# Patient Record
Sex: FEMALE | Race: AMERICAN INDIAN OR ALASKA NATIVE | Employment: OTHER | URBAN - METROPOLITAN AREA
[De-identification: names, ages, dates, MRNs, and addresses within clinical notes are randomized per-mention and may not be internally consistent; named-entity substitution may affect disease eponyms.]

---

## 2020-11-07 ENCOUNTER — HOSPITAL ENCOUNTER (EMERGENCY)
Age: 70
Discharge: HOME | End: 2020-11-07
Attending: EMERGENCY MEDICINE
Payer: MEDICARE

## 2020-11-07 VITALS
HEART RATE: 66 BPM | OXYGEN SATURATION: 99 % | DIASTOLIC BLOOD PRESSURE: 68 MMHG | RESPIRATION RATE: 12 BRPM | SYSTOLIC BLOOD PRESSURE: 131 MMHG | TEMPERATURE: 97.7 F

## 2020-11-07 LAB
ALBUMIN SERPL-MCNC: 4.8 G/DL (ref 3.4–5)
ALP BLD-CCNC: 44 U/L (ref 40–129)
ALT SERPL-CCNC: 25 U/L (ref 10–40)
ANION GAP SERPL CALCULATED.3IONS-SCNC: 14 MMOL/L (ref 3–16)
AST SERPL-CCNC: 46 U/L (ref 15–37)
BACTERIA: ABNORMAL /HPF
BASE EXCESS VENOUS: -0.2 MMOL/L (ref -2–3)
BASOPHILS ABSOLUTE: 0 K/UL (ref 0–0.2)
BASOPHILS RELATIVE PERCENT: 0.5 %
BILIRUB SERPL-MCNC: 0.5 MG/DL (ref 0–1)
BILIRUBIN DIRECT: <0.2 MG/DL (ref 0–0.3)
BILIRUBIN URINE: NEGATIVE
BILIRUBIN, INDIRECT: ABNORMAL MG/DL (ref 0–1)
BLOOD, URINE: NEGATIVE
BUN BLDV-MCNC: 7 MG/DL (ref 7–20)
CALCIUM SERPL-MCNC: 9.4 MG/DL (ref 8.3–10.6)
CARBOXYHEMOGLOBIN: 1.3 % (ref 0–1.5)
CHLORIDE BLD-SCNC: 102 MMOL/L (ref 99–110)
CLARITY: CLEAR
CO2: 21 MMOL/L (ref 21–32)
COLOR: YELLOW
CREAT SERPL-MCNC: 0.5 MG/DL (ref 0.6–1.2)
EKG ATRIAL RATE: 80 BPM
EKG DIAGNOSIS: NORMAL
EKG P AXIS: 59 DEGREES
EKG P-R INTERVAL: 140 MS
EKG Q-T INTERVAL: 380 MS
EKG QRS DURATION: 80 MS
EKG QTC CALCULATION (BAZETT): 438 MS
EKG R AXIS: -1 DEGREES
EKG T AXIS: 77 DEGREES
EKG VENTRICULAR RATE: 80 BPM
EOSINOPHILS ABSOLUTE: 0.2 K/UL (ref 0–0.6)
EOSINOPHILS RELATIVE PERCENT: 3 %
EPITHELIAL CELLS, UA: ABNORMAL /HPF (ref 0–5)
GFR AFRICAN AMERICAN: >60
GFR NON-AFRICAN AMERICAN: >60
GLUCOSE BLD-MCNC: 149 MG/DL (ref 70–99)
GLUCOSE BLD-MCNC: 152 MG/DL (ref 70–99)
GLUCOSE URINE: NEGATIVE MG/DL
HCO3 VENOUS: 25 MMOL/L (ref 24–28)
HCT VFR BLD CALC: 42.4 % (ref 36–48)
HEMOGLOBIN, VEN, REDUCED: 56.2 %
HEMOGLOBIN: 14.4 G/DL (ref 12–16)
KETONES, URINE: 15 MG/DL
LEUKOCYTE ESTERASE, URINE: ABNORMAL
LIPASE: 48 U/L (ref 13–60)
LYMPHOCYTES ABSOLUTE: 1.8 K/UL (ref 1–5.1)
LYMPHOCYTES RELATIVE PERCENT: 28.7 %
MCH RBC QN AUTO: 34.1 PG (ref 26–34)
MCHC RBC AUTO-ENTMCNC: 34 G/DL (ref 31–36)
MCV RBC AUTO: 100.2 FL (ref 80–100)
METHEMOGLOBIN VENOUS: 0.7 % (ref 0–1.5)
MICROSCOPIC EXAMINATION: YES
MONOCYTES ABSOLUTE: 0.6 K/UL (ref 0–1.3)
MONOCYTES RELATIVE PERCENT: 9.2 %
NEUTROPHILS ABSOLUTE: 3.7 K/UL (ref 1.7–7.7)
NEUTROPHILS RELATIVE PERCENT: 58.6 %
NITRITE, URINE: NEGATIVE
O2 SAT, VEN: 43 %
PCO2, VEN: 45.1 MMHG (ref 41–51)
PDW BLD-RTO: 14.6 % (ref 12.4–15.4)
PERFORMED ON: ABNORMAL
PH UA: 6 (ref 5–8)
PH VENOUS: 7.36 (ref 7.35–7.45)
PLATELET # BLD: 240 K/UL (ref 135–450)
PMV BLD AUTO: 8.1 FL (ref 5–10.5)
PO2, VEN: 27.7 MMHG (ref 25–40)
POTASSIUM REFLEX MAGNESIUM: 4.7 MMOL/L (ref 3.5–5.1)
POTASSIUM SERPL-SCNC: 4 MMOL/L (ref 3.5–5.1)
PROTEIN UA: NEGATIVE MG/DL
RBC # BLD: 4.24 M/UL (ref 4–5.2)
RBC UA: ABNORMAL /HPF (ref 0–4)
RENAL EPITHELIAL, UA: ABNORMAL /HPF (ref 0–1)
SODIUM BLD-SCNC: 137 MMOL/L (ref 136–145)
SPECIFIC GRAVITY UA: 1.01 (ref 1–1.03)
TCO2 CALC VENOUS: 26 MMOL/L
TOTAL PROTEIN: 7.6 G/DL (ref 6.4–8.2)
TROPONIN: <0.01 NG/ML
URINE TYPE: ABNORMAL
UROBILINOGEN, URINE: 0.2 E.U./DL
WBC # BLD: 6.2 K/UL (ref 4–11)
WBC UA: ABNORMAL /HPF (ref 0–5)

## 2020-11-07 PROCEDURE — 82803 BLOOD GASES ANY COMBINATION: CPT

## 2020-11-07 PROCEDURE — 84439 ASSAY OF FREE THYROXINE: CPT

## 2020-11-07 PROCEDURE — 84484 ASSAY OF TROPONIN QUANT: CPT

## 2020-11-07 PROCEDURE — 80048 BASIC METABOLIC PNL TOTAL CA: CPT

## 2020-11-07 PROCEDURE — 2580000003 HC RX 258: Performed by: EMERGENCY MEDICINE

## 2020-11-07 PROCEDURE — 93005 ELECTROCARDIOGRAM TRACING: CPT | Performed by: EMERGENCY MEDICINE

## 2020-11-07 PROCEDURE — 83690 ASSAY OF LIPASE: CPT

## 2020-11-07 PROCEDURE — 80076 HEPATIC FUNCTION PANEL: CPT

## 2020-11-07 PROCEDURE — 99283 EMERGENCY DEPT VISIT LOW MDM: CPT

## 2020-11-07 PROCEDURE — 96361 HYDRATE IV INFUSION ADD-ON: CPT

## 2020-11-07 PROCEDURE — 96360 HYDRATION IV INFUSION INIT: CPT

## 2020-11-07 PROCEDURE — 85025 COMPLETE CBC W/AUTO DIFF WBC: CPT

## 2020-11-07 PROCEDURE — 84132 ASSAY OF SERUM POTASSIUM: CPT

## 2020-11-07 PROCEDURE — 84443 ASSAY THYROID STIM HORMONE: CPT

## 2020-11-07 PROCEDURE — 81001 URINALYSIS AUTO W/SCOPE: CPT

## 2020-11-07 RX ORDER — SODIUM CHLORIDE, SODIUM LACTATE, POTASSIUM CHLORIDE, AND CALCIUM CHLORIDE .6; .31; .03; .02 G/100ML; G/100ML; G/100ML; G/100ML
1000 INJECTION, SOLUTION INTRAVENOUS ONCE
Status: COMPLETED | OUTPATIENT
Start: 2020-11-07 | End: 2020-11-07

## 2020-11-07 RX ORDER — LISINOPRIL 5 MG/1
TABLET ORAL
Status: ON HOLD | COMMUNITY
Start: 2020-10-01 | End: 2022-06-18 | Stop reason: HOSPADM

## 2020-11-07 RX ADMIN — SODIUM CHLORIDE, SODIUM LACTATE, POTASSIUM CHLORIDE, AND CALCIUM CHLORIDE 1000 ML: .6; .31; .03; .02 INJECTION, SOLUTION INTRAVENOUS at 06:58

## 2020-11-07 RX ADMIN — SODIUM CHLORIDE, SODIUM LACTATE, POTASSIUM CHLORIDE, AND CALCIUM CHLORIDE 1000 ML: .6; .31; .03; .02 INJECTION, SOLUTION INTRAVENOUS at 05:50

## 2020-11-07 NOTE — ED NOTES
Pt presents to the ED from home with her spouse. Pt c/o frequent urination for approx x10 days, fatigue, and a new onset of dizziness this morning. PT a/o x4, answering questions appropriately. Denies any n/v/d.       Dolores Villasenor RN  11/07/20 7060

## 2020-11-07 NOTE — ED NOTES
Pt discharged to home. IV removed, tip intact and pressure applied. Pt given discharge instructions and verbalized understanding. Pt ambulatory at discharge and left without incident.       Lady Ovalles RN  11/07/20 7660

## 2020-11-07 NOTE — ED PROVIDER NOTES
[address] [facility] ENCOUNTER          ATTENDING PHYSICIAN NOTE       Date of evaluation: 11/7/2020    Chief Complaint     Dehydration (states she has been urinating frequently which she thinks is causing dehydration)      History of Present Illness     Kathryn King is a 78 y.o. female with a PMH as described below who presents to the ED today with a chief complaint of: Fatigue, weakness, polyuria. The patient states that she has had the symptoms for the past 10 days. She feels very weak and notes that she is peeing frequently and is concerned she is dehydrated. She denies any fever, chills, chest pain, shortness of breath, cough, sputum production, abdominal pain. She does note constipation and occasional dysuria. She states that she did see her primary care doctor and gynecologist recently early in October and was diagnosed with a UTI. On her medication list she has multiple different antibiotics including Cipro, Bactrim, and doxycycline. She states that she started taking Cipro yesterday as she had a some discomfort when urinating. She additionally states that she is being treated by her primary care doctor for diabetes but is not on medications and is currently doing diet changes to manage diabetes. She states that her blood glucose typically is in the low 100s although her A1c is 9. She does note that she started taking lisinopril 10 days ago and is concerned that this is the cause of her polyuria. The patientstates that there were no other associated signs and symptoms or modifying factors. Patient rates pain as 0/10. Review of Systems     As described above in the HPI otherwise all the systems reviewed and negative as reported by the patient. Past Medical, Surgical, Family, and Social History     She has no past medical history on file. She has no past surgical history on file. Her family history is not on file.   She     Medications     Discharge Medication List as of 11/7/2020  7:52 AM      CONTINUE these medications which have NOT CHANGED    Details   lisinopril (PRINIVIL;ZESTRIL) 5 MG tablet Historical Med             Allergies     She has No Known Allergies. Physical Exam     INITIAL VITALS: BP: (!) 158/84, Temp: 97.7 °F (36.5 °C), Pulse: 93, Resp: 19, SpO2: 96 %   Physical Exam  Vitals signs and nursing note reviewed. Constitutional:       Appearance: Normal appearance. She is normal weight. HENT:      Head: Normocephalic and atraumatic. Eyes:      Extraocular Movements: Extraocular movements intact. Pupils: Pupils are equal, round, and reactive to light. Neck:      Musculoskeletal: Normal range of motion. Cardiovascular:      Rate and Rhythm: Normal rate and regular rhythm. Pulses: Normal pulses. Heart sounds: Normal heart sounds. Pulmonary:      Effort: Pulmonary effort is normal.      Breath sounds: Normal breath sounds. Abdominal:      General: There is no distension. Tenderness: There is no abdominal tenderness. Musculoskeletal: Normal range of motion. General: No swelling or tenderness. Skin:     General: Skin is warm. Capillary Refill: Capillary refill takes less than 2 seconds. Neurological:      General: No focal deficit present. Mental Status: She is alert and oriented to person, place, and time. Mental status is at baseline. Psychiatric:         Mood and Affect: Mood normal.         DiagnosticResults     EKG   Indication: Fatigue: Ventricular rate 80, MA interval 140, QRS 80, QTc 438, axis -1 degrees. Normal sinus rhythm. No acute ST-T wave elevations or depressions concerning for acute ischemia or infarct. No prior EKG for comparison.     RADIOLOGY:  No orders to display       LABS:   Results for orders placed or performed during the hospital encounter of 11/07/20   CBC auto differential   Result Value Ref Range    WBC 6.2 4.0 - 11.0 K/uL    RBC 4.24 4.00 - 5.20 M/uL    Hemoglobin 14.4 12.0 - 16.0 g/dL    Hematocrit 42.4 36.0 - 48.0 %    .2 (H) 80.0 - 100.0 fL    MCH 34.1 (H) 26.0 - 34.0 pg    MCHC 34.0 31.0 - 36.0 g/dL    RDW 14.6 12.4 - 15.4 %    Platelets 900 355 - 441 K/uL    MPV 8.1 5.0 - 10.5 fL    Neutrophils % 58.6 %    Lymphocytes % 28.7 %    Monocytes % 9.2 %    Eosinophils % 3.0 %    Basophils % 0.5 %    Neutrophils Absolute 3.7 1.7 - 7.7 K/uL    Lymphocytes Absolute 1.8 1.0 - 5.1 K/uL    Monocytes Absolute 0.6 0.0 - 1.3 K/uL    Eosinophils Absolute 0.2 0.0 - 0.6 K/uL    Basophils Absolute 0.0 0.0 - 0.2 K/uL   Basic Metabolic Panel w/ Reflex to MG   Result Value Ref Range    Sodium 137 136 - 145 mmol/L    Potassium reflex Magnesium 4.7 3.5 - 5.1 mmol/L    Chloride 102 99 - 110 mmol/L    CO2 21 21 - 32 mmol/L    Anion Gap 14 3 - 16    Glucose 149 (H) 70 - 99 mg/dL    BUN 7 7 - 20 mg/dL    CREATININE 0.5 (L) 0.6 - 1.2 mg/dL    GFR Non-African American >60 >60    GFR African American >60 >60    Calcium 9.4 8.3 - 10.6 mg/dL   Blood gas, venous (Lab)   Result Value Ref Range    pH, Giovanni 7.362 7.350 - 7.450    pCO2, Giovanni 45.1 41.0 - 51.0 mmHg    pO2, Giovanni 27.7 25.0 - 40.0 mmHg    HCO3, Venous 25.0 24.0 - 28.0 mmol/L    Base Excess, Giovanni -0.2 -2.0 - 3.0 mmol/L    O2 Sat, Giovanni 43 Not established %    Carboxyhemoglobin 1.3 0.0 - 1.5 %    MetHgb, Giovanni 0.7 0.0 - 1.5 %    TC02 (Calc), Giovanni 26 mmol/L    Hemoglobin, Giovanni, Reduced 56.20 %   Troponin   Result Value Ref Range    Troponin <0.01 <0.01 ng/mL   Urinalysis, reflex to microscopic (Lab)   Result Value Ref Range    Color, UA Yellow Straw/Yellow    Clarity, UA Clear Clear    Glucose, Ur Negative Negative mg/dL    Bilirubin Urine Negative Negative    Ketones, Urine 15 (A) Negative mg/dL    Specific Gravity, UA 1.010 1.005 - 1.030    Blood, Urine Negative Negative    pH, UA 6.0 5.0 - 8.0    Protein, UA Negative Negative mg/dL    Urobilinogen, Urine 0.2 <2.0 E.U./dL    Nitrite, Urine Negative Negative    Leukocyte Esterase, Urine TRACE (A) Negative Microscopic Examination YES     Urine Type NotGiven    Hepatic function panel (LFTs)   Result Value Ref Range    Total Protein 7.6 6.4 - 8.2 g/dL    Alb 4.8 3.4 - 5.0 g/dL    Alkaline Phosphatase 44 40 - 129 U/L    ALT 25 10 - 40 U/L    AST 46 (H) 15 - 37 U/L    Total Bilirubin 0.5 0.0 - 1.0 mg/dL    Bilirubin, Direct <0.2 0.0 - 0.3 mg/dL    Bilirubin, Indirect see below 0.0 - 1.0 mg/dL   Lipase   Result Value Ref Range    Lipase 48.0 13.0 - 60.0 U/L   Microscopic Urinalysis   Result Value Ref Range    WBC, UA 0-2 0 - 5 /HPF    RBC, UA 0-2 0 - 4 /HPF    Epithelial Cells, UA 2-5 0 - 5 /HPF    Renal Epithelial, UA 0-1 0 - 1 /HPF    Bacteria, UA Rare (A) None Seen /HPF   Potassium (Lab)   Result Value Ref Range    Potassium 4.0 3.5 - 5.1 mmol/L   POCT Glucose   Result Value Ref Range    POC Glucose 152 (H) 70 - 99 mg/dl    Performed on ACCU-CHEK    EKG 12 Lead   Result Value Ref Range    Ventricular Rate 80 BPM    Atrial Rate 80 BPM    P-R Interval 140 ms    QRS Duration 80 ms    Q-T Interval 380 ms    QTc Calculation (Bazett) 438 ms    P Axis 59 degrees    R Axis -1 degrees    T Axis 77 degrees    Diagnosis       EKG performed in ER and to be interpreted by ER physician. Confirmed by MD, ER ([id number]),  Edilberto Almeida ([phone]) on 11/7/2020 6:57:53 AM       ED BEDSIDE ULTRASOUND:      RECENT VITALS:  BP: 131/68, Temp: 97.7 °F (36.5 °C),Pulse: 66, Resp: 12, SpO2: 99 %     Procedures         ED Course     Nursing Notes, Past Medical Hx, Past Surgical Hx, Social Hx, Allergies, and Family Hx werereviewed. The patient was given thefollowing medications:  Orders Placed This Encounter   Medications    lactated ringers bolus    lactated ringers bolus       CONSULTS:  None    MEDICAL DECISION MAKING / ASSESSMENT /  Reji is a 78 y.o. female on examination the patient is well-appearing with reassuring vital signs presenting with complaints of dehydration and fatigue.   She states that she has had the symptoms for approximately 10 days. She does note polyuria and polydipsia. She is concerned that this could be related to her diabetes or recent usage of lisinopril. It appears that she has been on antibiotics quite often over the past month for UTI-like symptoms and she states that she started taking ciprofloxacin again yesterday. Laboratory studies and EKG were performed and IV fluids were given. At this point labs are currently in process. The patient is receiving IVF and will be turned over to the oncoming provider. Please see addendum dictation for ultimate dispo. Clinical Impression     1. Dehydration    2.  Essential hypertension        Disposition     PATIENT REFERRED TO:  [facility]  [facility] [address]  [phone]    Schedule an appointment as soon as possible for a visit in 2 days      Stefanie Melgoza MD  [address] [address]  [phone]    Schedule an appointment as soon as possible for a visit in 2 days      [address] [place] [address] Rd  [phone]            DISCHARGE MEDICATIONS:  Discharge Medication List as of 11/7/2020  7:52 AM          DISPOSITION Decision To Discharge 11/07/2020 08:28:56 AM         Roosevelt Manuel MD  11/07/20 1640

## 2020-11-07 NOTE — ED PROVIDER NOTES
[address]          ATTENDING PHYSICIAN NOTE       Date of evaluation: 11/7/2020    ADDENDUM:      Care of this patient was assumed from Dr. Priscilla Platt. The patient was seen for Dehydration (states she has been urinating frequently which she thinks is causing dehydration)  . The patient's initial evaluation and plan have been discussed with the prior provider who initially evaluated the patient. Nursing Notes, Past Medical Hx, Past Surgical Hx, Social Hx, Allergies, and Family Hx were all reviewed. Diagnostic Results     EKG   Please see prior ED physician's note.     RADIOLOGY:  No orders to display       LABS:   Results for orders placed or performed during the hospital encounter of 11/07/20   CBC auto differential   Result Value Ref Range    WBC 6.2 4.0 - 11.0 K/uL    RBC 4.24 4.00 - 5.20 M/uL    Hemoglobin 14.4 12.0 - 16.0 g/dL    Hematocrit 42.4 36.0 - 48.0 %    .2 (H) 80.0 - 100.0 fL    MCH 34.1 (H) 26.0 - 34.0 pg    MCHC 34.0 31.0 - 36.0 g/dL    RDW 14.6 12.4 - 15.4 %    Platelets 186 391 - 403 K/uL    MPV 8.1 5.0 - 10.5 fL    Neutrophils % 58.6 %    Lymphocytes % 28.7 %    Monocytes % 9.2 %    Eosinophils % 3.0 %    Basophils % 0.5 %    Neutrophils Absolute 3.7 1.7 - 7.7 K/uL    Lymphocytes Absolute 1.8 1.0 - 5.1 K/uL    Monocytes Absolute 0.6 0.0 - 1.3 K/uL    Eosinophils Absolute 0.2 0.0 - 0.6 K/uL    Basophils Absolute 0.0 0.0 - 0.2 K/uL   Basic Metabolic Panel w/ Reflex to MG   Result Value Ref Range    Sodium 137 136 - 145 mmol/L    Potassium reflex Magnesium 4.7 3.5 - 5.1 mmol/L    Chloride 102 99 - 110 mmol/L    CO2 21 21 - 32 mmol/L    Anion Gap 14 3 - 16    Glucose 149 (H) 70 - 99 mg/dL    BUN 7 7 - 20 mg/dL    CREATININE 0.5 (L) 0.6 - 1.2 mg/dL    GFR Non-African American >60 >60    GFR African American >60 >60    Calcium 9.4 8.3 - 10.6 mg/dL   Blood gas, venous (Lab)   Result Value Ref Range    pH, Giovanni 7.362 7.350 - 7.450    pCO2, Giovanni 45.1 41.0 - to be interpreted by ER physician. Confirmed by MD, ER ([id number]),  Ramirez Acosta ([phone]) on 11/7/2020 6:57:53 AM       RECENT VITALS:  BP: (!) 157/74, Temp: 97.7 °F (36.5 °C), Pulse: 73, Resp: 9, SpO2: 100 %     Procedures     None performed. ED Course     The patient was given the following medications:  Orders Placed This Encounter   Medications    lactated ringers bolus    lactated ringers bolus       CONSULTS:  None    ED Course as of Nov 07 0732   Sat Nov 07, 2020   0668 Patient's labs reviewed, notable for slight ketones, but no signs of urinary tract infection. Renal function panel within normal limits. TSH pending. Patient reassessed, labs reviewed with patient and her  at bedside. Patient has received 1/2 L of IV fluid, anticipate discharge home after remaining fluid has been administered. [SM]      ED Course User Index  [SM] Kia Jordan MD       [facility] / ASSESSMENT / Matthias Maryuri is a 78 y.o. female with a past medical history of diet-controlled diabetes as well as hypertension on lisinopril that presents today for evaluation of possible dehydration versus increased urinary frequency. Patient states that she started lisinopril 10 days ago and has noticed that she has had increased frequency and pressure with urination. Work-up in the emergency department consisted of labs, as well as a urine analysis. Labs showed initial hemolyzed potassium that was repeated. Urinalysis showed trace ketones, but no signs of infection. Patient was given 2 L of IV fluid. Patient's blood pressure was noted to be elevated, she was told to continue her lisinopril, but if she thought that there was a problem with a potential side effect talk to her primary care doctor about changing. At this point, the patient is able to ambulate without assistance, she has a safe ride home, and she is asymptomatic.   She is stable for discharge home with follow-up with her primary care

## 2020-11-09 LAB
T4 FREE: 1.1 NG/DL (ref 0.9–1.8)
TSH REFLEX: 51.52 UIU/ML (ref 0.27–4.2)

## 2020-11-19 ENCOUNTER — TELEPHONE (OUTPATIENT)
Dept: INTERNAL MEDICINE CLINIC | Age: 70
End: 2020-11-19

## 2020-11-19 ENCOUNTER — HOSPITAL ENCOUNTER (EMERGENCY)
Age: 70
Discharge: HOME | End: 2020-11-19
Attending: EMERGENCY MEDICINE
Payer: MEDICARE

## 2020-11-19 VITALS
TEMPERATURE: 97.8 F | SYSTOLIC BLOOD PRESSURE: 153 MMHG | DIASTOLIC BLOOD PRESSURE: 64 MMHG | OXYGEN SATURATION: 98 % | HEART RATE: 91 BPM | RESPIRATION RATE: 20 BRPM

## 2020-11-19 LAB
A/G RATIO: 1.7 (ref 1.1–2.2)
ALBUMIN SERPL-MCNC: 4.5 G/DL (ref 3.4–5)
ALP BLD-CCNC: 49 U/L (ref 40–129)
ALT SERPL-CCNC: 21 U/L (ref 10–40)
ANION GAP SERPL CALCULATED.3IONS-SCNC: 13 MMOL/L (ref 3–16)
AST SERPL-CCNC: 23 U/L (ref 15–37)
BASOPHILS ABSOLUTE: 0 K/UL (ref 0–0.2)
BASOPHILS RELATIVE PERCENT: 0.7 %
BILIRUB SERPL-MCNC: 0.5 MG/DL (ref 0–1)
BILIRUBIN URINE: NEGATIVE
BLOOD, URINE: NEGATIVE
BUN BLDV-MCNC: 10 MG/DL (ref 7–20)
CALCIUM SERPL-MCNC: 9.5 MG/DL (ref 8.3–10.6)
CHLORIDE BLD-SCNC: 101 MMOL/L (ref 99–110)
CLARITY: CLEAR
CO2: 27 MMOL/L (ref 21–32)
COLOR: YELLOW
CREAT SERPL-MCNC: 0.5 MG/DL (ref 0.6–1.2)
EOSINOPHILS ABSOLUTE: 0.1 K/UL (ref 0–0.6)
EOSINOPHILS RELATIVE PERCENT: 1.9 %
GFR AFRICAN AMERICAN: >60
GFR NON-AFRICAN AMERICAN: >60
GLOBULIN: 2.7 G/DL
GLUCOSE BLD-MCNC: 164 MG/DL (ref 70–99)
GLUCOSE BLD-MCNC: 172 MG/DL (ref 70–99)
GLUCOSE URINE: NEGATIVE MG/DL
HCT VFR BLD CALC: 38.6 % (ref 36–48)
HEMOGLOBIN: 13.6 G/DL (ref 12–16)
KETONES, URINE: ABNORMAL MG/DL
LEUKOCYTE ESTERASE, URINE: NEGATIVE
LYMPHOCYTES ABSOLUTE: 1.2 K/UL (ref 1–5.1)
LYMPHOCYTES RELATIVE PERCENT: 22.9 %
MCH RBC QN AUTO: 34.5 PG (ref 26–34)
MCHC RBC AUTO-ENTMCNC: 35.3 G/DL (ref 31–36)
MCV RBC AUTO: 97.8 FL (ref 80–100)
MICROSCOPIC EXAMINATION: ABNORMAL
MONOCYTES ABSOLUTE: 0.5 K/UL (ref 0–1.3)
MONOCYTES RELATIVE PERCENT: 9.8 %
NEUTROPHILS ABSOLUTE: 3.3 K/UL (ref 1.7–7.7)
NEUTROPHILS RELATIVE PERCENT: 64.7 %
NITRITE, URINE: NEGATIVE
PDW BLD-RTO: 14.3 % (ref 12.4–15.4)
PERFORMED ON: ABNORMAL
PH UA: 7 (ref 5–8)
PLATELET # BLD: 244 K/UL (ref 135–450)
PMV BLD AUTO: 7.7 FL (ref 5–10.5)
POTASSIUM REFLEX MAGNESIUM: 3.6 MMOL/L (ref 3.5–5.1)
PROTEIN UA: NEGATIVE MG/DL
RBC # BLD: 3.94 M/UL (ref 4–5.2)
SODIUM BLD-SCNC: 141 MMOL/L (ref 136–145)
SPECIFIC GRAVITY UA: 1.02 (ref 1–1.03)
T3 FREE: 2.3 PG/ML (ref 2.3–4.2)
T4 FREE: 1.7 NG/DL (ref 0.9–1.8)
TOTAL PROTEIN: 7.2 G/DL (ref 6.4–8.2)
TSH SERPL DL<=0.05 MIU/L-ACNC: 7.3 UIU/ML (ref 0.27–4.2)
URINE TYPE: ABNORMAL
UROBILINOGEN, URINE: 0.2 E.U./DL
WBC # BLD: 5.2 K/UL (ref 4–11)

## 2020-11-19 PROCEDURE — 2580000003 HC RX 258: Performed by: EMERGENCY MEDICINE

## 2020-11-19 PROCEDURE — 96360 HYDRATION IV INFUSION INIT: CPT

## 2020-11-19 PROCEDURE — 84443 ASSAY THYROID STIM HORMONE: CPT

## 2020-11-19 PROCEDURE — 85025 COMPLETE CBC W/AUTO DIFF WBC: CPT

## 2020-11-19 PROCEDURE — 99283 EMERGENCY DEPT VISIT LOW MDM: CPT

## 2020-11-19 PROCEDURE — 84481 FREE ASSAY (FT-3): CPT

## 2020-11-19 PROCEDURE — 81003 URINALYSIS AUTO W/O SCOPE: CPT

## 2020-11-19 PROCEDURE — 36415 COLL VENOUS BLD VENIPUNCTURE: CPT

## 2020-11-19 PROCEDURE — 80053 COMPREHEN METABOLIC PANEL: CPT

## 2020-11-19 PROCEDURE — 83036 HEMOGLOBIN GLYCOSYLATED A1C: CPT

## 2020-11-19 PROCEDURE — 84439 ASSAY OF FREE THYROXINE: CPT

## 2020-11-19 PROCEDURE — 96361 HYDRATE IV INFUSION ADD-ON: CPT

## 2020-11-19 RX ORDER — 0.9 % SODIUM CHLORIDE 0.9 %
1000 INTRAVENOUS SOLUTION INTRAVENOUS ONCE
Status: COMPLETED | OUTPATIENT
Start: 2020-11-19 | End: 2020-11-19

## 2020-11-19 RX ADMIN — SODIUM CHLORIDE 1000 ML: 9 INJECTION, SOLUTION INTRAVENOUS at 08:00

## 2020-11-19 ASSESSMENT — ENCOUNTER SYMPTOMS
COUGH: 0
RESPIRATORY NEGATIVE: 1
BACK PAIN: 0
RHINORRHEA: 0
ABDOMINAL PAIN: 0
VOMITING: 0
GASTROINTESTINAL NEGATIVE: 1
SHORTNESS OF BREATH: 0
NAUSEA: 0
SORE THROAT: 0
DIARRHEA: 0
EYES NEGATIVE: 1
CHEST TIGHTNESS: 0

## 2020-11-19 NOTE — ED NOTES
Pt discharged with written instructions. Heplock removed. Pt verbalizes understanding and walked to lobby.      Osvaldo Lan RN  11/19/20 8635

## 2020-11-19 NOTE — ED PROVIDER NOTES
[address]          ATTENDING PHYSICIAN NOTE       Date of evaluation: 11/19/2020    Chief Complaint     Urinary Frequency      History of Present Illness     Juan José Graves is a 78 y.o. female who presents to the emergency department today with concerns regarding recurrence of polyuria. Patient notes that her primary care physician is an \"integrative physician,\" and has diagnosed her with type 2 diabetes, but is attempting to help her control her diabetes with diet and exercise, and has not initiated any medication regimen to this point. The patient feels that she has had an increasing difficulty with polydipsia and polyuria over approximately the last month, but is concerned that it is worsening. She was seen in this emergency department 12 days ago with the same concerns, stating that she was experiencing polydipsia and polyuria, together with some generalized fatigue and weakness, and was concerned that she was dehydrated. Her exam at that time was unremarkable, and laboratory evaluation reassuring, notable only for a mildly elevated glucose at 149, with an entirely normal BUN and creatinine and otherwise normal electrolytes. Her urinalysis showed a normal specific gravity and a small amount of ketones, with no evidence of infection. Patient was given a liter normal saline IV fluids, and felt improved. Patient tells me that she has felt resolution of her symptoms of fatigue and weakness and does not feel that she experienced polydipsia or polyuria since she was rehydrated with IV fluids in the emergency department, until just last night, when she feels that her symptoms recurred. She tells me that she felt that she had to get up and urinate multiple times through the night, and that each time she felt that she passed a significant amount of urine. This morning she states that when she urinated she felt \"as though it would never stop. \"      The patient denies any dysuria, hesitancy, hematuria, suprapubic abdominal pain or any other abdominal pain, flank pain, fevers or chills, nausea or vomiting. She denies any dizziness or lightheadedness, headaches, visual changes, chest pain, shortness of breath, or URI symptoms. She feels that her skin in her mouth are dry. She states that she feels quite thirsty right now. She does feel that she drinks plenty of water, but still feels as though she is not able to keep up with the amount that she is urinating. She feels that she is not having difficulty eating, but she does note that she is on a strict vegetarian and sugar-free diet attempting to control her diabetes, and she feels that her diet is bland and boring. The patient discusses numerous concerns, including concerns about hypothyroidism, vitamin D deficiency, and her hemoglobin A1c. She states that she has an appointment upcoming with her endocrinologist, but not until December. Review of Systems     Review of Systems   Constitutional: Positive for fatigue. Negative for activity change, appetite change, chills and fever. HENT: Negative. Negative for congestion, rhinorrhea and sore throat. Eyes: Negative. Negative for visual disturbance. Respiratory: Negative. Negative for cough, chest tightness and shortness of breath. Cardiovascular: Negative. Negative for chest pain and palpitations. Gastrointestinal: Negative. Negative for abdominal pain, diarrhea, nausea and vomiting. Endocrine: Positive for polydipsia and polyuria. Negative for polyphagia. Genitourinary: Positive for frequency. Negative for decreased urine volume, difficulty urinating, dysuria, flank pain, pelvic pain, urgency, vaginal bleeding and vaginal discharge. Musculoskeletal: Negative for arthralgias, back pain and myalgias. Skin: Negative for rash. Allergic/Immunologic: Negative for immunocompromised state. Neurological: Positive for weakness.  Negative for dizziness, syncope, light-headedness and headaches. Psychiatric/Behavioral: The patient is nervous/anxious. Past Medical, Surgical, Family, and Social History     She has a past medical history of Diabetes mellitus ([address] and Thyroid disease. She has no past surgical history on file. Her family history is not on file. She     Medications     Previous Medications    LISINOPRIL (PRINIVIL;ZESTRIL) 5 MG TABLET           Allergies     She has No Known Allergies. Physical Exam     INITIAL VITALS: BP: (!) 197/97, Temp: 97.8 °F (36.6 °C), Pulse: 91, Resp: 20, SpO2: 97 %     General: Well appearing, younger appearing than her stated age. Pleasantly conversational, and in NAD. HEENT: Pupils are equal, round, and reactive to light. Extraocular muscles are intact. Conjunctivae are clear and moist. No redness or drainage from the eyes. No drainage from the nose. The oropharynx appeared to be normal.  Lips appear slightly dry, but mucous membranes are moist.    Neck: Supple, with full range of motion. Cardiovascular: Normal S1-S2 without murmur rub or gallop. 2+ radial pulses bilaterally. Respiratory: Unlabored breathing with equal chest rise and fall. Lungs are clear to auscultation bilaterally. No adventitious lung sounds heard. Abdomen: Soft and nontender, without guarding or rebound tenderness. No masses or hepatosplenomegaly. Skin: Warm and dry, without rashes or ecchymoses, lacerations or abrasions. Neuro: Alert and oriented x3. No focal neurologic deficits are noted. Extremities: Warm and well-perfused, without clubbing, cyanosis, or edema. The patient moves all extremities equally. Psych: The patient's mood and affect are generally within normal limits for their presentation.       Diagnostic Results       RADIOLOGY:  No orders to display       LABS:   Results for orders placed or performed during the hospital encounter of 11/19/20   CBC auto differential   Result Value Ref Range WBC 5.2 4.0 - 11.0 K/uL    RBC 3.94 (L) 4.00 - 5.20 M/uL    Hemoglobin 13.6 12.0 - 16.0 g/dL    Hematocrit 38.6 36.0 - 48.0 %    MCV 97.8 80.0 - 100.0 fL    MCH 34.5 (H) 26.0 - 34.0 pg    MCHC 35.3 31.0 - 36.0 g/dL    RDW 14.3 12.4 - 15.4 %    Platelets 252 126 - 444 K/uL    MPV 7.7 5.0 - 10.5 fL    Neutrophils % 64.7 %    Lymphocytes % 22.9 %    Monocytes % 9.8 %    Eosinophils % 1.9 %    Basophils % 0.7 %    Neutrophils Absolute 3.3 1.7 - 7.7 K/uL    Lymphocytes Absolute 1.2 1.0 - 5.1 K/uL    Monocytes Absolute 0.5 0.0 - 1.3 K/uL    Eosinophils Absolute 0.1 0.0 - 0.6 K/uL    Basophils Absolute 0.0 0.0 - 0.2 K/uL   Comprehensive Metabolic Panel w/ Reflex to MG   Result Value Ref Range    Sodium 141 136 - 145 mmol/L    Potassium reflex Magnesium 3.6 3.5 - 5.1 mmol/L    Chloride 101 99 - 110 mmol/L    CO2 27 21 - 32 mmol/L    Anion Gap 13 3 - 16    Glucose 164 (H) 70 - 99 mg/dL    BUN 10 7 - 20 mg/dL    CREATININE 0.5 (L) 0.6 - 1.2 mg/dL    GFR Non-African American >60 >60    GFR African American >60 >60    Calcium 9.5 8.3 - 10.6 mg/dL    Total Protein 7.2 6.4 - 8.2 g/dL    Alb 4.5 3.4 - 5.0 g/dL    Albumin/Globulin Ratio 1.7 1.1 - 2.2    Total Bilirubin 0.5 0.0 - 1.0 mg/dL    Alkaline Phosphatase 49 40 - 129 U/L    ALT 21 10 - 40 U/L    AST 23 15 - 37 U/L    Globulin 2.7 g/dL   Urinalysis, reflex to microscopic (Lab)   Result Value Ref Range    Color, UA Yellow Straw/Yellow    Clarity, UA Clear Clear    Glucose, Ur Negative Negative mg/dL    Bilirubin Urine Negative Negative    Ketones, Urine TRACE (A) Negative mg/dL    Specific Gravity, UA 1.020 1.005 - 1.030    Blood, Urine Negative Negative    pH, UA 7.0 5.0 - 8.0    Protein, UA Negative Negative mg/dL    Urobilinogen, Urine 0.2 <2.0 E.U./dL    Nitrite, Urine Negative Negative    Leukocyte Esterase, Urine Negative Negative    Microscopic Examination Not Indicated     Urine Type NotGiven    POCT Glucose   Result Value Ref Range    POC Glucose 172 (H) 70 - 99 mg/dl    Performed on ACCU-CHEK          RECENT VITALS:  BP: (!) 153/64, Temp: 97.8 °F (36.6 °C), Pulse: 91, Resp: 20, SpO2: 98 %     Procedures         ED Course     Nursing Notes, Past Medical Hx, Past Surgical Hx, Social Hx, Allergies, and Family Hx were reviewed. The patient was given the following medications:  Orders Placed This Encounter   Medications    0.9 % sodium chloride bolus       CONSULTS:  None    MEDICAL DECISION MAKING / ASSESSMENT / Alonzo Alicia is a 78 y.o. female, with a history of hypothyroidism, as well as newer onset of type 2 diabetes mellitus which is being initially managed by her primary care provider with diet and exercise. She notes that between August and October her hemoglobin A1c did improve from 9-6.1, but feels that she is having continued problems with episodes of polyuria and polydipsia. She had some concerns about dehydration or possible urinary tract infection. Her vital signs are normal, and physical examination unremarkable. Her urinalysis shows trace ketones, but otherwise her laboratory evaluation in the emergency department was entirely unremarkable, without findings concerning for clinically significant dehydration, infection, electrolyte disturbance, or other abnormalities. Her glucose, similar to her prior visit, is mildly elevated. The patient requested that thyroid studies and a hemoglobin A1c be ordered, so these were ordered in the emergency department, although results are yet pending. The patient was given 1 L normal saline IV fluid bolus, and endorsed resolution of her sense of fatigue and generally feeling unwell. She believes that she may need to be started on Metformin, and I advised her to discuss this with her primary care physician. She also has been referred to follow-up with endocrinology and has her first appointment in December.   She would like to be seen sooner, and I advised her to discuss her ED visit with the endocrinology office to see if she can be seen more expeditiously. After hydration with IV fluids, the patient's now feels much improved and is comfortable with the plan for discharge and outpatient follow-up with her primary care provider. Clinical Impression     1. Dehydration symptoms    2. Polyuria        Disposition     PATIENT REFERRED TO:  Kristina [address]  [address]  [address] [place] [address]  [phone]    Call today  to discuss your ER visit, and arrange a follow-up appointment    Pardeep Golden MD  [place] [address]  [phone]    Call today  to discuss your ER visit, and arrange a closer follow-up appointment, to discuss management of diabetes and hypothyroidsim      DISCHARGE MEDICATIONS:  New Prescriptions    No medications on file       DISPOSITION discharged    (Please note that portions of this note were completed with voice recognition software.   Efforts were made to edit the dictations but occasionally words are mis-transcribed.)     eLo Cosby MD  11/19/20 1948

## 2020-11-19 NOTE — ED TRIAGE NOTES
Pt states that she was diagnosed diabetic but is diet controlled. C/o increased urination for the last few days. 's on arrival. HTN noted but the patient states that she is nervous. Pt up to bathroom to provide urine sample.

## 2020-11-20 LAB
ESTIMATED AVERAGE GLUCOSE: 116.9 MG/DL
HBA1C MFR BLD: 5.7 %

## 2020-12-08 NOTE — TELEPHONE ENCOUNTER
FYI-  Attempt to reach out to pt to set up np appt but no answer and no vm, so I was unable to leave a vm however if pt happens to call back it is ok for her to be seen by Dr. Jeremy Allen at his next avail. Appt.  plsa advise

## 2022-06-17 ENCOUNTER — APPOINTMENT (OUTPATIENT)
Dept: GENERAL RADIOLOGY | Age: 72
DRG: 607 | End: 2022-06-17
Payer: MEDICARE

## 2022-06-17 ENCOUNTER — HOSPITAL ENCOUNTER (INPATIENT)
Age: 72
LOS: 1 days | Discharge: HOME | DRG: 607 | End: 2022-06-18
Attending: STUDENT IN AN ORGANIZED HEALTH CARE EDUCATION/TRAINING PROGRAM | Admitting: INTERNAL MEDICINE
Payer: MEDICARE

## 2022-06-17 DIAGNOSIS — E09.10 DIABETIC KETOACIDOSIS WITHOUT COMA ASSOCIATED WITH DRUG OR CHEMICAL INDUCED DIABETES MELLITUS (HCC): Primary | ICD-10-CM

## 2022-06-17 PROBLEM — J12.9 VIRAL PNEUMONIA: Status: ACTIVE | Noted: 2022-06-17

## 2022-06-17 LAB
ALBUMIN SERPL-MCNC: 4 G/DL (ref 3.4–5)
ALP BLD-CCNC: 43 U/L (ref 40–129)
ALT SERPL-CCNC: 17 U/L (ref 10–40)
ANION GAP SERPL CALCULATED.3IONS-SCNC: 13 MMOL/L (ref 3–16)
ANION GAP SERPL CALCULATED.3IONS-SCNC: 17 MMOL/L (ref 3–16)
AST SERPL-CCNC: 49 U/L (ref 15–37)
BASE EXCESS VENOUS: -2.3 MMOL/L (ref -2–3)
BASOPHILS ABSOLUTE: 0 K/UL (ref 0–0.2)
BASOPHILS RELATIVE PERCENT: 0.2 %
BILIRUB SERPL-MCNC: 1.5 MG/DL (ref 0–1)
BILIRUBIN DIRECT: <0.2 MG/DL (ref 0–0.3)
BILIRUBIN URINE: NEGATIVE
BILIRUBIN, INDIRECT: ABNORMAL MG/DL (ref 0–1)
BLOOD, URINE: NEGATIVE
BUN BLDV-MCNC: 19 MG/DL (ref 7–20)
BUN BLDV-MCNC: 21 MG/DL (ref 7–20)
CALCIUM SERPL-MCNC: 8.6 MG/DL (ref 8.3–10.6)
CALCIUM SERPL-MCNC: 9.4 MG/DL (ref 8.3–10.6)
CARBOXYHEMOGLOBIN: 1.9 % (ref 0–1.5)
CHLORIDE BLD-SCNC: 100 MMOL/L (ref 99–110)
CHLORIDE BLD-SCNC: 95 MMOL/L (ref 99–110)
CLARITY: CLEAR
CO2: 17 MMOL/L (ref 21–32)
CO2: 18 MMOL/L (ref 21–32)
COLOR: YELLOW
CREAT SERPL-MCNC: 0.6 MG/DL (ref 0.6–1.2)
CREAT SERPL-MCNC: 0.8 MG/DL (ref 0.6–1.2)
EOSINOPHILS ABSOLUTE: 0 K/UL (ref 0–0.6)
EOSINOPHILS RELATIVE PERCENT: 0.2 %
EPITHELIAL CELLS, UA: ABNORMAL /HPF (ref 0–5)
GFR AFRICAN AMERICAN: >60
GFR AFRICAN AMERICAN: >60
GFR NON-AFRICAN AMERICAN: >60
GFR NON-AFRICAN AMERICAN: >60
GLUCOSE BLD-MCNC: 296 MG/DL (ref 70–99)
GLUCOSE BLD-MCNC: 302 MG/DL (ref 70–99)
GLUCOSE URINE: 250 MG/DL
HCO3 VENOUS: 21.3 MMOL/L (ref 24–28)
HCT VFR BLD CALC: 40.8 % (ref 36–48)
HEMOGLOBIN, VEN, REDUCED: 30.9 %
HEMOGLOBIN: 14.4 G/DL (ref 12–16)
HYALINE CASTS: ABNORMAL /LPF (ref 0–2)
INR BLD: 1.16 (ref 0.87–1.14)
KETONES, URINE: 40 MG/DL
LACTIC ACID, SEPSIS: 2.3 MMOL/L (ref 0.4–1.9)
LACTIC ACID, SEPSIS: 2.9 MMOL/L (ref 0.4–1.9)
LEUKOCYTE ESTERASE, URINE: ABNORMAL
LIPASE: 62 U/L (ref 13–60)
LYMPHOCYTES ABSOLUTE: 0.9 K/UL (ref 1–5.1)
LYMPHOCYTES RELATIVE PERCENT: 7.7 %
MCH RBC QN AUTO: 33.4 PG (ref 26–34)
MCHC RBC AUTO-ENTMCNC: 35.2 G/DL (ref 31–36)
MCV RBC AUTO: 94.8 FL (ref 80–100)
METHEMOGLOBIN VENOUS: 0.2 % (ref 0–1.5)
MICROSCOPIC EXAMINATION: YES
MONOCYTES ABSOLUTE: 0.2 K/UL (ref 0–1.3)
MONOCYTES RELATIVE PERCENT: 1.8 %
NEUTROPHILS ABSOLUTE: 10 K/UL (ref 1.7–7.7)
NEUTROPHILS RELATIVE PERCENT: 90.1 %
NITRITE, URINE: NEGATIVE
O2 SAT, VEN: 68 %
PCO2, VEN: 33.6 MMHG (ref 41–51)
PDW BLD-RTO: 12.8 % (ref 12.4–15.4)
PH UA: 6 (ref 5–8)
PH VENOUS: 7.41 (ref 7.35–7.45)
PLATELET # BLD: 313 K/UL (ref 135–450)
PMV BLD AUTO: 8.3 FL (ref 5–10.5)
PO2, VEN: 35.9 MMHG (ref 25–40)
POTASSIUM REFLEX MAGNESIUM: 4.8 MMOL/L (ref 3.5–5.1)
POTASSIUM REFLEX MAGNESIUM: 5.2 MMOL/L (ref 3.5–5.1)
PRO-BNP: 114 PG/ML (ref 0–124)
PROTEIN UA: NEGATIVE MG/DL
PROTHROMBIN TIME: 14.7 SEC (ref 11.7–14.5)
RBC # BLD: 4.31 M/UL (ref 4–5.2)
RBC UA: ABNORMAL /HPF (ref 0–4)
SODIUM BLD-SCNC: 129 MMOL/L (ref 136–145)
SODIUM BLD-SCNC: 131 MMOL/L (ref 136–145)
SPECIFIC GRAVITY UA: 1.01 (ref 1–1.03)
TCO2 CALC VENOUS: 22 MMOL/L
TOTAL PROTEIN: 6.9 G/DL (ref 6.4–8.2)
TROPONIN: <0.01 NG/ML
URINE TYPE: ABNORMAL
UROBILINOGEN, URINE: 0.2 E.U./DL
WBC # BLD: 11.1 K/UL (ref 4–11)
WBC UA: ABNORMAL /HPF (ref 0–5)

## 2022-06-17 PROCEDURE — 85610 PROTHROMBIN TIME: CPT

## 2022-06-17 PROCEDURE — G0378 HOSPITAL OBSERVATION PER HR: HCPCS

## 2022-06-17 PROCEDURE — 6370000000 HC RX 637 (ALT 250 FOR IP): Performed by: STUDENT IN AN ORGANIZED HEALTH CARE EDUCATION/TRAINING PROGRAM

## 2022-06-17 PROCEDURE — 36415 COLL VENOUS BLD VENIPUNCTURE: CPT

## 2022-06-17 PROCEDURE — 2580000003 HC RX 258: Performed by: INTERNAL MEDICINE

## 2022-06-17 PROCEDURE — 6360000002 HC RX W HCPCS: Performed by: STUDENT IN AN ORGANIZED HEALTH CARE EDUCATION/TRAINING PROGRAM

## 2022-06-17 PROCEDURE — 2580000003 HC RX 258: Performed by: STUDENT IN AN ORGANIZED HEALTH CARE EDUCATION/TRAINING PROGRAM

## 2022-06-17 PROCEDURE — 80076 HEPATIC FUNCTION PANEL: CPT

## 2022-06-17 PROCEDURE — 71045 X-RAY EXAM CHEST 1 VIEW: CPT

## 2022-06-17 PROCEDURE — 83605 ASSAY OF LACTIC ACID: CPT

## 2022-06-17 PROCEDURE — 82803 BLOOD GASES ANY COMBINATION: CPT

## 2022-06-17 PROCEDURE — 96361 HYDRATE IV INFUSION ADD-ON: CPT

## 2022-06-17 PROCEDURE — 85025 COMPLETE CBC W/AUTO DIFF WBC: CPT

## 2022-06-17 PROCEDURE — 87086 URINE CULTURE/COLONY COUNT: CPT

## 2022-06-17 PROCEDURE — 1200000000 HC SEMI PRIVATE

## 2022-06-17 PROCEDURE — 80048 BASIC METABOLIC PNL TOTAL CA: CPT

## 2022-06-17 PROCEDURE — 93005 ELECTROCARDIOGRAM TRACING: CPT

## 2022-06-17 PROCEDURE — 99285 EMERGENCY DEPT VISIT HI MDM: CPT

## 2022-06-17 PROCEDURE — 83880 ASSAY OF NATRIURETIC PEPTIDE: CPT

## 2022-06-17 PROCEDURE — 84484 ASSAY OF TROPONIN QUANT: CPT

## 2022-06-17 PROCEDURE — 96374 THER/PROPH/DIAG INJ IV PUSH: CPT

## 2022-06-17 PROCEDURE — 81001 URINALYSIS AUTO W/SCOPE: CPT

## 2022-06-17 PROCEDURE — 83690 ASSAY OF LIPASE: CPT

## 2022-06-17 RX ORDER — ACETAMINOPHEN 650 MG/1
650 SUPPOSITORY RECTAL EVERY 4 HOURS PRN
Status: DISCONTINUED | OUTPATIENT
Start: 2022-06-17 | End: 2022-06-18 | Stop reason: HOSPADM

## 2022-06-17 RX ORDER — SODIUM CHLORIDE 9 MG/ML
INJECTION, SOLUTION INTRAVENOUS PRN
Status: DISCONTINUED | OUTPATIENT
Start: 2022-06-17 | End: 2022-06-18 | Stop reason: HOSPADM

## 2022-06-17 RX ORDER — SODIUM CHLORIDE, SODIUM LACTATE, POTASSIUM CHLORIDE, AND CALCIUM CHLORIDE .6; .31; .03; .02 G/100ML; G/100ML; G/100ML; G/100ML
500 INJECTION, SOLUTION INTRAVENOUS ONCE
Status: COMPLETED | OUTPATIENT
Start: 2022-06-17 | End: 2022-06-17

## 2022-06-17 RX ORDER — ACETAMINOPHEN 325 MG/1
650 TABLET ORAL EVERY 4 HOURS PRN
Status: DISCONTINUED | OUTPATIENT
Start: 2022-06-17 | End: 2022-06-18 | Stop reason: HOSPADM

## 2022-06-17 RX ORDER — ONDANSETRON 2 MG/ML
4 INJECTION INTRAMUSCULAR; INTRAVENOUS EVERY 4 HOURS PRN
Status: DISCONTINUED | OUTPATIENT
Start: 2022-06-17 | End: 2022-06-18 | Stop reason: HOSPADM

## 2022-06-17 RX ORDER — LISINOPRIL 5 MG/1
5 TABLET ORAL DAILY
Status: DISCONTINUED | OUTPATIENT
Start: 2022-06-18 | End: 2022-06-18 | Stop reason: HOSPADM

## 2022-06-17 RX ORDER — CETIRIZINE HYDROCHLORIDE 10 MG/1
10 TABLET ORAL DAILY
Status: DISCONTINUED | OUTPATIENT
Start: 2022-06-17 | End: 2022-06-18

## 2022-06-17 RX ORDER — DIPHENHYDRAMINE HYDROCHLORIDE 50 MG/ML
25 INJECTION INTRAMUSCULAR; INTRAVENOUS ONCE
Status: COMPLETED | OUTPATIENT
Start: 2022-06-17 | End: 2022-06-17

## 2022-06-17 RX ORDER — SODIUM CHLORIDE 0.9 % (FLUSH) 0.9 %
10 SYRINGE (ML) INJECTION EVERY 12 HOURS SCHEDULED
Status: DISCONTINUED | OUTPATIENT
Start: 2022-06-17 | End: 2022-06-18 | Stop reason: HOSPADM

## 2022-06-17 RX ORDER — ENOXAPARIN SODIUM 100 MG/ML
40 INJECTION SUBCUTANEOUS DAILY
Status: DISCONTINUED | OUTPATIENT
Start: 2022-06-18 | End: 2022-06-18 | Stop reason: HOSPADM

## 2022-06-17 RX ORDER — SODIUM CHLORIDE 0.9 % (FLUSH) 0.9 %
10 SYRINGE (ML) INJECTION PRN
Status: DISCONTINUED | OUTPATIENT
Start: 2022-06-17 | End: 2022-06-18 | Stop reason: HOSPADM

## 2022-06-17 RX ORDER — SODIUM CHLORIDE, SODIUM LACTATE, POTASSIUM CHLORIDE, AND CALCIUM CHLORIDE .6; .31; .03; .02 G/100ML; G/100ML; G/100ML; G/100ML
1000 INJECTION, SOLUTION INTRAVENOUS ONCE
Status: COMPLETED | OUTPATIENT
Start: 2022-06-17 | End: 2022-06-17

## 2022-06-17 RX ORDER — SODIUM CHLORIDE 9 MG/ML
INJECTION, SOLUTION INTRAVENOUS CONTINUOUS
Status: DISCONTINUED | OUTPATIENT
Start: 2022-06-17 | End: 2022-06-18

## 2022-06-17 RX ORDER — POLYETHYLENE GLYCOL 3350 17 G/17G
17 POWDER, FOR SOLUTION ORAL DAILY PRN
Status: DISCONTINUED | OUTPATIENT
Start: 2022-06-17 | End: 2022-06-18 | Stop reason: HOSPADM

## 2022-06-17 RX ADMIN — SODIUM CHLORIDE, PRESERVATIVE FREE 10 ML: 5 INJECTION INTRAVENOUS at 23:18

## 2022-06-17 RX ADMIN — CETIRIZINE HYDROCHLORIDE 10 MG: 10 TABLET, FILM COATED ORAL at 17:58

## 2022-06-17 RX ADMIN — SODIUM CHLORIDE: 9 INJECTION, SOLUTION INTRAVENOUS at 23:18

## 2022-06-17 RX ADMIN — DIPHENHYDRAMINE HYDROCHLORIDE 25 MG: 50 INJECTION, SOLUTION INTRAMUSCULAR; INTRAVENOUS at 17:58

## 2022-06-17 RX ADMIN — SODIUM CHLORIDE, SODIUM LACTATE, POTASSIUM CHLORIDE, AND CALCIUM CHLORIDE 500 ML: .6; .31; .03; .02 INJECTION, SOLUTION INTRAVENOUS at 20:00

## 2022-06-17 RX ADMIN — SODIUM CHLORIDE, POTASSIUM CHLORIDE, SODIUM LACTATE AND CALCIUM CHLORIDE 1000 ML: 600; 310; 30; 20 INJECTION, SOLUTION INTRAVENOUS at 17:59

## 2022-06-17 ASSESSMENT — PAIN - FUNCTIONAL ASSESSMENT: PAIN_FUNCTIONAL_ASSESSMENT: NONE - DENIES PAIN

## 2022-06-17 NOTE — ED PROVIDER NOTES
a past medical history of Diabetes mellitus ([facility] [address] and Thyroid disease. She has no past surgical history on file. Her family history is not on file. She     Medications     Current Discharge Medication List      CONTINUE these medications which have NOT CHANGED    Details   lisinopril (PRINIVIL;ZESTRIL) 5 MG tablet              Allergies     She has No Known Allergies. Physical Exam     INITIAL VITALS: BP: 118/79, Temp: 97.7 °F (36.5 °C), Heart Rate: (!) 111, Resp: 20, SpO2: 100 %     General:  Well appearing. No acute distress. Non-toxic appearing    Eyes:  Pupils equally round, reactive, brisk. No discharge from eyes. ENT: The external ears are normal. The TMs are clear bilaterally. The external nose is unremarkable, and there is no significant nasal or ear drainage on exam. The mucosa are moist. The uvula is midline, elevates in the midline, and is free of edema. There are no tonsillar exudates. The submandibular space is soft and free of significant swelling. There is no drooling, dysphonia, pooled secretions, stridor, or active bleeding. The overall quality of dentition is good. The mastoid is nontender. The buccal space is free of significant edema. There is no trismus. There are absolutely no lesions. The lips are not obviously edematous and there is no soft or hard palate edema. Neck:  Supple. Nontender. Pulmonary:   Non-labored breathing. Breath sounds clear bilaterally. Cardiac:  Regular rate and rhythm. No murmurs. Abdomen:  Soft. Non-tender throughout. Non-distended. No masses. Musculoskeletal:  No long bone deformity. No ankle or wrist deformity. Vascular:  Extremities warm and perfused. Skin:  Erythematous, coalescing, serpiginous, raised rash predominantly on trunk with extension to neck and anterior thighs; no blister or hemorrhagic crusting. No petichiae Warm. Neuro: Alert and oriented x 3. CN II-XII grossly intact. Speech and mentation normal.    ESME.   Sensation grossly intact to light touch. Extremities:  No peripheral edema. LE symmetric. Diagnostic Results     EKG   Indication chest shortness of breath     EKG Interpretation     Interpreted by me (emergency department physician)     Rhythm:  sinus tachycardia  Rate: 106  Axis: normal (-1)  Ectopy: none  Conduction: normal (QTc 459)  ST Segments: no acute change  T Waves: no acute change  Q Waves: nonspecific pattern     Clinical Impression:   Sinus tachycardia  This is a non-specific EKG with no significant change compared to the prior EKG dated 11/7/2020. There is no evidence of significant interval prolongation. There is no evidence of acute ischemia. [facility]      RADIOLOGY:  XR CHEST PORTABLE   Final Result      Questionable increased interstitial opacities in both lungs, which may be incidental, secondary to minimal interstitial edema, or atypical pneumonia.           LABS:   Results for orders placed or performed during the hospital encounter of 06/17/22   CBC with Auto Differential   Result Value Ref Range    WBC 11.1 (H) 4.0 - 11.0 K/uL    RBC 4.31 4.00 - 5.20 M/uL    Hemoglobin 14.4 12.0 - 16.0 g/dL    Hematocrit 40.8 36.0 - 48.0 %    MCV 94.8 80.0 - 100.0 fL    MCH 33.4 26.0 - 34.0 pg    MCHC 35.2 31.0 - 36.0 g/dL    RDW 12.8 12.4 - 15.4 %    Platelets 277 628 - 413 K/uL    MPV 8.3 5.0 - 10.5 fL    Neutrophils % 90.1 %    Lymphocytes % 7.7 %    Monocytes % 1.8 %    Eosinophils % 0.2 %    Basophils % 0.2 %    Neutrophils Absolute 10.0 (H) 1.7 - 7.7 K/uL    Lymphocytes Absolute 0.9 (L) 1.0 - 5.1 K/uL    Monocytes Absolute 0.2 0.0 - 1.3 K/uL    Eosinophils Absolute 0.0 0.0 - 0.6 K/uL    Basophils Absolute 0.0 0.0 - 0.2 K/uL   Hepatic Function Panel   Result Value Ref Range    Total Protein 6.9 6.4 - 8.2 g/dL    Albumin 4.0 3.4 - 5.0 g/dL    Alkaline Phosphatase 43 40 - 129 U/L    ALT 17 10 - 40 U/L    AST 49 (H) 15 - 37 U/L    Total Bilirubin 1.5 (H) 0.0 - 1.0 mg/dL    Bilirubin, Direct <0.2 0.0 - 0.3 mg/dL Bilirubin, Indirect see below 0.0 - 1.0 mg/dL   Basic Metabolic Panel w/ Reflex to MG   Result Value Ref Range    Sodium 129 (L) 136 - 145 mmol/L    Potassium reflex Magnesium 5.2 (H) 3.5 - 5.1 mmol/L    Chloride 95 (L) 99 - 110 mmol/L    CO2 17 (L) 21 - 32 mmol/L    Anion Gap 17 (H) 3 - 16    Glucose 296 (H) 70 - 99 mg/dL    BUN 21 (H) 7 - 20 mg/dL    CREATININE 0.8 0.6 - 1.2 mg/dL    GFR Non-African American >60 >60    GFR African American >60 >60    Calcium 9.4 8.3 - 10.6 mg/dL   Lipase   Result Value Ref Range    Lipase 62.0 (H) 13.0 - 60.0 U/L   Blood Gas, Venous   Result Value Ref Range    pH, Giovanni 7.411 7.350 - 7.450    pCO2, Giovanni 33.6 (L) 41.0 - 51.0 mmHg    pO2, Giovanni 35.9 25.0 - 40.0 mmHg    HCO3, Venous 21.3 (L) 24.0 - 28.0 mmol/L    Base Excess, Giovanni -2.3 (L) -2.0 - 3.0 mmol/L    O2 Sat, Giovanni 68 Not established %    Carboxyhemoglobin 1.9 (H) 0.0 - 1.5 %    MetHgb, Giovanni 0.2 0.0 - 1.5 %    TC02 (Calc), Giovanni 22 mmol/L    Hemoglobin, Giovanni, Reduced 30.90 %   Lactate, Sepsis   Result Value Ref Range    Lactic Acid, Sepsis 2.3 (H) 0.4 - 1.9 mmol/L   Lactate, Sepsis   Result Value Ref Range    Lactic Acid, Sepsis 2.9 (H) 0.4 - 1.9 mmol/L   Urinalysis with Microscopic   Result Value Ref Range    Color, UA Yellow Straw/Yellow    Clarity, UA Clear Clear    Glucose, Ur 250 (A) Negative mg/dL    Bilirubin Urine Negative Negative    Ketones, Urine 40 (A) Negative mg/dL    Specific Gravity, UA 1.010 1.005 - 1.030    Blood, Urine Negative Negative    pH, UA 6.0 5.0 - 8.0    Protein, UA Negative Negative mg/dL    Urobilinogen, Urine 0.2 <2.0 E.U./dL    Nitrite, Urine Negative Negative    Leukocyte Esterase, Urine SMALL (A) Negative    Microscopic Examination YES     Urine Type NotGiven     Hyaline Casts, UA 11-20 (A) 0 - 2 /LPF    WBC, UA 0-2 0 - 5 /HPF    RBC, UA None seen 0 - 4 /HPF    Epithelial Cells, UA 2-5 0 - 5 /HPF   Protime-INR   Result Value Ref Range    Protime 14.7 (H) 11.7 - 14.5 sec    INR 1.16 (H) 0.87 - 1.14 Troponin   Result Value Ref Range    Troponin <0.01 <0.01 ng/mL   Brain Natriuretic Peptide   Result Value Ref Range    Pro- 0 - 124 pg/mL   Basic Metabolic Panel w/ Reflex to MG   Result Value Ref Range    Sodium 131 (L) 136 - 145 mmol/L    Potassium reflex Magnesium 4.8 3.5 - 5.1 mmol/L    Chloride 100 99 - 110 mmol/L    CO2 18 (L) 21 - 32 mmol/L    Anion Gap 13 3 - 16    Glucose 302 (H) 70 - 99 mg/dL    BUN 19 7 - 20 mg/dL    CREATININE 0.6 0.6 - 1.2 mg/dL    GFR Non-African American >60 >60    GFR African American >60 >60    Calcium 8.6 8.3 - 10.6 mg/dL       ED BEDSIDE ULTRASOUND:  None performed    Procedures     None performed    ED Course     Nursing Notes, Past Medical Hx, Past Surgical Hx, Social Hx, Allergies, and Family Hx were reviewed. The patient was given the following medications:  Orders Placed This Encounter   Medications    diphenhydrAMINE (BENADRYL) injection 25 mg    cetirizine (ZYRTEC) tablet 10 mg    lactated ringers bolus    lactated ringers bolus    lisinopril (PRINIVIL;ZESTRIL) tablet 5 mg    sodium chloride flush 0.9 % injection 10 mL    sodium chloride flush 0.9 % injection 10 mL    0.9 % sodium chloride infusion    enoxaparin (LOVENOX) injection 40 mg     Order Specific Question:   Indication of Use     Answer:   Prophylaxis-DVT/PE    ondansetron (ZOFRAN) injection 4 mg    polyethylene glycol (GLYCOLAX) packet 17 g    OR Linked Order Group     acetaminophen (TYLENOL) tablet 650 mg     acetaminophen (TYLENOL) suppository 650 mg    0.9 % sodium chloride infusion       CONSULTS:  IP CONSULT TO HOSPITALIST    MEDICAL DECISIONMAKING / ASSESSMENT / Emerald Gary is a 81 y.o. female with rash. Pt was hemodynamically stable and afebrile in the Emergency Department. The patient initially had a low triage blood pressure, which I expect was spurious.   She was awake alert conversant had no signs or symptoms of hypotension and a palpable radial pulse at the time of the reported low blood pressure. Given the rash and low blood pressure she was brought back for evaluation. In the trauma room her blood pressure is normal her exam is not consistent with anaphylaxis. It is also not consistent with angioedema although she does take benazepril. I advised her to permanently discontinue this medication given her reported lip swelling out of an abundance of caution and discuss another antihypertensive with her doctor. Given the patient's comorbidities a broad laboratory evaluation was begun. She does have DKA. Urinalysis notable for ketones and she has an acidosis to 17 with a gap of 17. She is also hyperglycemic above 250. I suspect the Decadron is contributing to this. I did not give any additional steroids for his reason. She did receive 25 mg of Benadryl and 10 mg of cetirizine. She reported significant provement with this. I gave her some volume of 500 cc IV fluids. Her gap closed but she has persistent DKA although she has improved. Lactate does increase from 2.3-2.9 despite 500 cc of IV fluid. Given this I do feel that she will require admission for mild DKA, likely induced due to the the Decadron. In terms of rash, it is not SJS or TEN by exam.  She has no oral lesions. It is also not consistent with DRESS that she has no eosinophilia. I am most suspicious for viral exanthem. We will send respiratory viral panel, which I discussed with the admitting hospitalist follow-up. She has a mild nonspecific transaminitis and bilirubin elevation without any new anemia. She has a mild nonspecific leukocytosis. There is no thrombocytopenia. VBG is unremarkable other than previously noted acidosis without acidemia. Her INR is nonspecifically prolonged. Her lipase is nonspecifically elevated at 62, but at a low suspicion for pancreatitis clinically. Troponin is not elevated BNP is also not elevated.   Urinalysis does not show evidence of

## 2022-06-18 ENCOUNTER — APPOINTMENT (OUTPATIENT)
Dept: GENERAL RADIOLOGY | Age: 72
DRG: 607 | End: 2022-06-18
Payer: MEDICARE

## 2022-06-18 VITALS
BODY MASS INDEX: 21.14 KG/M2 | HEIGHT: 62 IN | DIASTOLIC BLOOD PRESSURE: 83 MMHG | OXYGEN SATURATION: 99 % | SYSTOLIC BLOOD PRESSURE: 143 MMHG | TEMPERATURE: 98.9 F | RESPIRATION RATE: 18 BRPM | HEART RATE: 105 BPM | WEIGHT: 114.86 LBS

## 2022-06-18 LAB
ANION GAP SERPL CALCULATED.3IONS-SCNC: 11 MMOL/L (ref 3–16)
BASOPHILS ABSOLUTE: 0 K/UL (ref 0–0.2)
BASOPHILS RELATIVE PERCENT: 0.2 %
BUN BLDV-MCNC: 18 MG/DL (ref 7–20)
CALCIUM SERPL-MCNC: 8.8 MG/DL (ref 8.3–10.6)
CHLORIDE BLD-SCNC: 104 MMOL/L (ref 99–110)
CO2: 21 MMOL/L (ref 21–32)
CREAT SERPL-MCNC: 0.7 MG/DL (ref 0.6–1.2)
EOSINOPHILS ABSOLUTE: 0 K/UL (ref 0–0.6)
EOSINOPHILS RELATIVE PERCENT: 0 %
GFR AFRICAN AMERICAN: >60
GFR NON-AFRICAN AMERICAN: >60
GLUCOSE BLD-MCNC: 152 MG/DL (ref 70–99)
GLUCOSE BLD-MCNC: 157 MG/DL (ref 70–99)
GLUCOSE BLD-MCNC: 280 MG/DL (ref 70–99)
HCT VFR BLD CALC: 34.5 % (ref 36–48)
HEMOGLOBIN: 12.1 G/DL (ref 12–16)
LACTIC ACID: 1.9 MMOL/L (ref 0.4–2)
LYMPHOCYTES ABSOLUTE: 0.8 K/UL (ref 1–5.1)
LYMPHOCYTES RELATIVE PERCENT: 12.1 %
MCH RBC QN AUTO: 33.1 PG (ref 26–34)
MCHC RBC AUTO-ENTMCNC: 35 G/DL (ref 31–36)
MCV RBC AUTO: 94.7 FL (ref 80–100)
MONOCYTES ABSOLUTE: 0.2 K/UL (ref 0–1.3)
MONOCYTES RELATIVE PERCENT: 3.2 %
NEUTROPHILS ABSOLUTE: 5.9 K/UL (ref 1.7–7.7)
NEUTROPHILS RELATIVE PERCENT: 84.5 %
PDW BLD-RTO: 12.6 % (ref 12.4–15.4)
PERFORMED ON: ABNORMAL
PERFORMED ON: ABNORMAL
PLATELET # BLD: 250 K/UL (ref 135–450)
PMV BLD AUTO: 8.1 FL (ref 5–10.5)
POTASSIUM REFLEX MAGNESIUM: 4.2 MMOL/L (ref 3.5–5.1)
RBC # BLD: 3.64 M/UL (ref 4–5.2)
SODIUM BLD-SCNC: 136 MMOL/L (ref 136–145)
URINE CULTURE, ROUTINE: NORMAL
WBC # BLD: 7 K/UL (ref 4–11)

## 2022-06-18 PROCEDURE — 36415 COLL VENOUS BLD VENIPUNCTURE: CPT

## 2022-06-18 PROCEDURE — G0378 HOSPITAL OBSERVATION PER HR: HCPCS

## 2022-06-18 PROCEDURE — 71045 X-RAY EXAM CHEST 1 VIEW: CPT

## 2022-06-18 PROCEDURE — 96375 TX/PRO/DX INJ NEW DRUG ADDON: CPT

## 2022-06-18 PROCEDURE — 2580000003 HC RX 258: Performed by: INTERNAL MEDICINE

## 2022-06-18 PROCEDURE — 85025 COMPLETE CBC W/AUTO DIFF WBC: CPT

## 2022-06-18 PROCEDURE — 80048 BASIC METABOLIC PNL TOTAL CA: CPT

## 2022-06-18 PROCEDURE — 6370000000 HC RX 637 (ALT 250 FOR IP): Performed by: INTERNAL MEDICINE

## 2022-06-18 PROCEDURE — 6360000002 HC RX W HCPCS: Performed by: INTERNAL MEDICINE

## 2022-06-18 PROCEDURE — 74018 RADEX ABDOMEN 1 VIEW: CPT

## 2022-06-18 RX ORDER — METHYLPREDNISOLONE SODIUM SUCCINATE 40 MG/ML
20 INJECTION, POWDER, LYOPHILIZED, FOR SOLUTION INTRAMUSCULAR; INTRAVENOUS ONCE
Status: COMPLETED | OUTPATIENT
Start: 2022-06-18 | End: 2022-06-18

## 2022-06-18 RX ORDER — PREDNISONE 20 MG/1
20 TABLET ORAL DAILY
Qty: 3 TABLET | Refills: 0 | Status: SHIPPED | OUTPATIENT
Start: 2022-06-18 | End: 2022-06-21

## 2022-06-18 RX ORDER — DIPHENHYDRAMINE HCL 25 MG
25 CAPSULE ORAL EVERY 6 HOURS PRN
Qty: 12 CAPSULE | Refills: 0 | Status: CANCELLED | OUTPATIENT
Start: 2022-06-18 | End: 2022-06-21

## 2022-06-18 RX ORDER — DIPHENHYDRAMINE HYDROCHLORIDE 50 MG/ML
12.5 INJECTION INTRAMUSCULAR; INTRAVENOUS ONCE
Status: COMPLETED | OUTPATIENT
Start: 2022-06-18 | End: 2022-06-18

## 2022-06-18 RX ORDER — CETIRIZINE HYDROCHLORIDE 5 MG/1
10 TABLET ORAL DAILY
Status: DISCONTINUED | OUTPATIENT
Start: 2022-06-18 | End: 2022-06-18

## 2022-06-18 RX ORDER — METHYLPREDNISOLONE SODIUM SUCCINATE 40 MG/ML
40 INJECTION, POWDER, LYOPHILIZED, FOR SOLUTION INTRAMUSCULAR; INTRAVENOUS ONCE
Status: DISCONTINUED | OUTPATIENT
Start: 2022-06-18 | End: 2022-06-18

## 2022-06-18 RX ADMIN — DIPHENHYDRAMINE HYDROCHLORIDE 12.5 MG: 50 INJECTION, SOLUTION INTRAMUSCULAR; INTRAVENOUS at 11:15

## 2022-06-18 RX ADMIN — SODIUM CHLORIDE, PRESERVATIVE FREE 10 ML: 5 INJECTION INTRAVENOUS at 08:58

## 2022-06-18 RX ADMIN — CETIRIZINE HYDROCHLORIDE 10 MG: 10 TABLET, FILM COATED ORAL at 08:57

## 2022-06-18 RX ADMIN — METHYLPREDNISOLONE SODIUM SUCCINATE 20 MG: 40 INJECTION, POWDER, FOR SOLUTION INTRAMUSCULAR; INTRAVENOUS at 11:15

## 2022-06-18 ASSESSMENT — PAIN SCALES - GENERAL
PAINLEVEL_OUTOF10: 0

## 2022-06-18 NOTE — PROGRESS NOTES
Discharge note: Patient has been seen by doctor. Discharge order obtained, and discharge instructions reviewed with patient. Patient educated, using the teach back method, about follow up instructions and discharge instructions. A completed copy of the AVS instructions given to patient and all questions answered. IV catheter removed without complaints from right hand, catheter intact, site WNL. Waiting on transportation home by family.

## 2022-06-18 NOTE — PLAN OF CARE
Problem: Discharge Planning  Goal: Discharge to home or other facility with appropriate resources  Recent Flowsheet Documentation  Taken 6/18/2022 0013 by Katheryn Davis RN  Discharge to home or other facility with appropriate resources: Identify barriers to discharge with patient and caregiver  Taken 6/17/2022 2306 by Katheryn Davis RN  Discharge to home or other facility with appropriate resources: Identify discharge learning needs (meds, wound care, etc)

## 2022-06-18 NOTE — PLAN OF CARE
Discharge education completed     Problem: Discharge Planning  Goal: Discharge to home or other facility with appropriate resources  6/18/2022 1634 by Mic Loja RN  Outcome: Adequate for Discharge  Flowsheets (Taken 6/18/2022 1634)  Discharge to home or other facility with appropriate resources:   Identify barriers to discharge with patient and caregiver   Arrange for needed discharge resources and transportation as appropriate   Identify discharge learning needs (meds, wound care, etc)  6/18/2022 1634 by Mic Loja RN  Outcome: Adequate for Discharge  Flowsheets  Taken 6/18/2022 1634  Discharge to home or other facility with appropriate resources:   Identify barriers to discharge with patient and caregiver   Arrange for needed discharge resources and transportation as appropriate   Identify discharge learning needs (meds, wound care, etc)  Taken 6/18/2022 0855  Discharge to home or other facility with appropriate resources: Identify barriers to discharge with patient and caregiver     Problem: Pain  Goal: Verbalizes/displays adequate comfort level or baseline comfort level  6/18/2022 1634 by Mic Loja RN  Outcome: Adequate for Discharge  Flowsheets (Taken 6/18/2022 1634)  Verbalizes/displays adequate comfort level or baseline comfort level:   Encourage patient to monitor pain and request assistance   Assess pain using appropriate pain scale   Implement non-pharmacological measures as appropriate and evaluate response  6/18/2022 1634 by Mic Loja RN  Outcome: Adequate for Discharge  Flowsheets  Taken 6/18/2022 1634  Verbalizes/displays adequate comfort level or baseline comfort level:   Encourage patient to monitor pain and request assistance   Assess pain using appropriate pain scale   Implement non-pharmacological measures as appropriate and evaluate response  Taken 6/18/2022 1231  Verbalizes/displays adequate comfort level or baseline comfort level: Encourage patient to monitor pain and request assistance  Taken 6/18/2022 8937  Verbalizes/displays adequate comfort level or baseline comfort level: Encourage patient to monitor pain and request assistance

## 2022-06-18 NOTE — PROGRESS NOTES
Patient is taken via wheelchair by Earlene Gallardo to personal vehicle to have family transport home. Spoke with patient about missing jacket (cream color with flowers on sleeves). Aware spoke with Zakia Moran in ED and security and no jacket was found. Informed to call security in a couple days to see if jacket is turned in.  Verbalized understanding

## 2022-06-18 NOTE — PROGRESS NOTES
4 Eyes Admission Assessment     I agree as the admission nurse that 2 RN's have performed a thorough Head to Toe Skin Assessment on the patient. ALL assessment sites listed below have been assessed on admission. Areas assessed by both nurses:   [x]   Head, Face, and Ears   [x]   Shoulders, Back, and Chest  [x]   Arms, Elbows, and Hands   [x]   Coccyx, Sacrum, and Ischium  [x]   Legs, Feet, and Heels        Does the Patient have Skin Breakdown?   No generalized hives        Nghia Prevention initiated:  No   Wound Care Orders initiated:  No      [facility] nurse consulted for Pressure Injury (Stage 3,4, Unstageable, DTI, NWPT, and Complex wounds) or Nghia score 18 or lower:  No      Nurse 1 eSignature: Electronically signed by Keli Hinojosa RN on 6/17/22 at 10:52 PM EDT    **SHARE this note so that the co-signing nurse is able to place an eSignature**    Nurse 2 eSignature: Electronically signed by Gee Tinoco RN on 6/17/22 at 10:52 PM EDT

## 2022-06-18 NOTE — PROGRESS NOTES
Patient is not interested in staying 1 more night, patient requested to be discharged. Patient states she does not want steroid treatment because her blood sugars go up, she tries to avoid pills. Patient changed her mind and now requesting oral steroids at discharge.    Patient mention she will take over-the-counter Benadryl for itching/rash

## 2022-06-18 NOTE — H&P
Hospital Medicine History & Physical      PCP: Jacobo Milian    Date of Admission: 6/17/2022    Chief Complaint: Rash, itching, shortness of breath    History Of Present Illness:  Patient is 72-year-old female who is not on any regular medications at home presented to hospital for itching, rash and shortness of breath. According to the patient she thinks she started after eating nuts at public gathering, mentions her rash has improved, her itching has also improved after getting steroids and Benadryl. Mentions she is concerned about increasing blood sugars after steroids. Otherwise denied fevers chills chest pain nausea vomiting diarrhea constipation dysuria. Past Medical History:          Diagnosis Date    Diabetes mellitus ([address]     Thyroid disease        Past Surgical History:      No past surgical history on file. Medications Prior to Admission:      Prior to Admission medications    Not on File       Allergies:  Patient has no known allergies. Social History:      TOBACCO:   has no history on file for tobacco use. ETOH:   has no history on file for alcohol use. Family History:       Reviewed in detail and non contributory      No family history on file. REVIEW OF SYSTEMS:   Pertinent positives as noted in the HPI. All other systems reviewed and negative. PHYSICAL EXAM PERFORMED:    BP (!) 143/83   Pulse (!) 105   Temp 98.9 °F (37.2 °C) (Oral)   Resp 18   Ht 5' 2\" (1.575 m)   Wt 114 lb 13.8 oz (52.1 kg)   SpO2 99%   BMI 21.01 kg/m²     General appearance:  No apparent distress, cooperative. HEENT:  Normal cephalic, atraumatic without obvious deformity. Conjunctivae/corneas clear. Neck: Supple, with full range of motion. No cervical lymphadenopathy  Respiratory:  Normal respiratory effort. Clear to auscultation, bilaterally without Rales/Wheezes/Rhonchi. Cardiovascular:  Regular rate and rhythm with normal S1/S2 without murmurs, rubs or gallops.   Abdomen: Soft, non-tender, non-distended, normal bowel sounds. Musculoskeletal:  No edema noted bilaterally. No tenderness on palpation   Skin: There is visible rash around her neck area  Neurologic:  Neurologically intact without any focal sensory/motor deficits. grossly non-focal.  Psychiatric:  Alert and oriented, normal mood  Peripheral Pulses: +2 palpable, equal bilaterally       Labs:     Recent Labs     06/17/22 1747 06/18/22 0445   WBC 11.1* 7.0   HGB 14.4 12.1   HCT 40.8 34.5*    250     Recent Labs     06/17/22 1747 06/17/22 2054 06/18/22  0445   * 131* 136   K 5.2* 4.8 4.2   CL 95* 100 104   CO2 17* 18* 21   BUN 21* 19 18   CREATININE 0.8 0.6 0.7   CALCIUM 9.4 8.6 8.8     Recent Labs     06/17/22 1747   AST 49*   ALT 17   BILIDIR <0.2   BILITOT 1.5*   ALKPHOS 43     Recent Labs     06/17/22 1747   INR 1.16*     Recent Labs     06/17/22 1747   TROPONINI <0.01       Urinalysis:      Lab Results   Component Value Date    NITRU Negative 06/17/2022    WBCUA 0-2 06/17/2022    BACTERIA Rare 11/07/2020    RBCUA None seen 06/17/2022    BLOODU Negative 06/17/2022    SPECGRAV 1.010 06/17/2022    GLUCOSEU 250 06/17/2022       Radiology:       XR ABDOMEN (KUB) (SINGLE AP VIEW)   Final Result      Nonspecific, nonobstructive bowel gas pattern. XR CHEST 1 VIEW   Final Result      No acute cardiopulmonary findings. XR CHEST PORTABLE   Final Result      Questionable increased interstitial opacities in both lungs, which may be incidental, secondary to minimal interstitial edema, or atypical pneumonia. Active Hospital Problems    Diagnosis Date Noted    Viral pneumonia [J12.9] 06/17/2022     Priority: Medium       Patient is 29-year-old female who is not on any regular medications at home presented to hospital for itching, rash and shortness of breath.   According to the patient she thinks she started after eating nuts at public gathering, mentions her rash has improved, her itching has also improved after getting steroids and Benadryl. Mentions she is concerned about increasing blood sugars after steroids. Otherwise denied fevers chills chest pain nausea vomiting diarrhea constipation dysuria. Assessment  Urticaria, itching, shortness of breath likely secondary to allergic reaction    Plan  S/p IV steroids  Pepcid  As needed bilateral  Chest x-ray on admission did show suspected pulmonary edema, which has been resolved on the x-ray from today. Patient wishes to be discharged home  Diet: ADULT DIET; Regular; 5 carb choices (75 gm/meal)  Code Status: Full Code    PT/OT Eval Status: ordered    Dispo - pending clinical improvement       Amy Lenz MD    The note was completed using EMR and Dragon dictation system. Every effort was made to ensure accuracy; however, inadvertent computerized transcription errors may be present. Thank you Nalini Shukla for the opportunity to be involved in this patient's care. If you have any questions or concerns please feel free to contact me at [phone].     Amy Lenz MD

## 2022-06-22 LAB
EKG ATRIAL RATE: 106 BPM
EKG DIAGNOSIS: NORMAL
EKG P AXIS: 51 DEGREES
EKG P-R INTERVAL: 126 MS
EKG Q-T INTERVAL: 346 MS
EKG QRS DURATION: 80 MS
EKG QTC CALCULATION (BAZETT): 459 MS
EKG R AXIS: -1 DEGREES
EKG T AXIS: 14 DEGREES
EKG VENTRICULAR RATE: 106 BPM

## 2022-07-21 NOTE — DISCHARGE SUMMARY
Hospital Medicine Discharge Summary    Patient ID: Talon Lerma      Patient's PCP: Lachelle Celestin    Admit Date: 6/17/2022     Discharge Date: 6/18/2022    Admitting Physician: Davian Camarena MD     Discharge Physician: Luigi York MD     Discharge Diagnoses: Active Hospital Problems    Diagnosis Date Noted    Viral pneumonia [J12.9] 06/17/2022     Priority: Medium       The patient was seen and examined on day of discharge and this discharge summary is in conjunction with any daily progress note from day of discharge. Condition at discharge - stable    Hospital Course:   See same date progress note/HnP    Consults:     IP CONSULT TO HOSPITALIST      Code Status:  Prior    Activity: activity as tolerated    Labs: For convenience and continuity at follow-up the following most recent labs are provided:      CBC:    Lab Results   Component Value Date/Time    WBC 7.0 06/18/2022 04:45 AM    HGB 12.1 06/18/2022 04:45 AM    HCT 34.5 06/18/2022 04:45 AM     06/18/2022 04:45 AM       Renal:    Lab Results   Component Value Date/Time     06/18/2022 04:45 AM    K 4.2 06/18/2022 04:45 AM     06/18/2022 04:45 AM    CO2 21 06/18/2022 04:45 AM    BUN 18 06/18/2022 04:45 AM    CREATININE 0.7 06/18/2022 04:45 AM    CALCIUM 8.8 06/18/2022 04:45 AM       Discharge Medications:     Discharge Medication List as of 6/18/2022  5:28 PM             Details   predniSONE (DELTASONE) 20 MG tablet Take 1 tablet by mouth daily for 3 days, Disp-3 tablet, R-0Normal             Time Spent on discharge is more than 30 mints in the examination, evaluation, counseling and review of medications and discharge plan. Signed:    Luigi York MD   7/21/2022      Thank you Lachelle Celestin for the opportunity to be involved in this patient's care. If you have any questions or concerns please feel free to contact me at [phone].

## 2024-09-24 ENCOUNTER — HOSPITAL ENCOUNTER (OUTPATIENT)
Dept: MRI IMAGING | Age: 74
Discharge: HOME | End: 2024-09-24
Attending: ORTHOPAEDIC SURGERY
Payer: MEDICARE

## 2024-09-24 DIAGNOSIS — M65.9 TENOSYNOVITIS OF WRIST: ICD-10-CM

## 2024-09-24 DIAGNOSIS — M54.12 RADICULOPATHY, CERVICAL REGION: ICD-10-CM

## 2024-09-24 PROCEDURE — 73221 MRI JOINT UPR EXTREM W/O DYE: CPT

## 2024-11-06 ENCOUNTER — TELEPHONE (OUTPATIENT)
Dept: ORTHOPEDIC SURGERY | Age: 74
End: 2024-11-06

## 2024-11-06 NOTE — TELEPHONE ENCOUNTER
General Question     Subject: 2ND OPINION  Patient and /or Facility Request: Romina Sanchez   Contact Number: [phone]    PT WANTS TO BE SEEN WITH DR HARRY FOR A 2ND OPINION ON RT HAND.     PT HAS ALREADY WENT TO [place], AND HAS IMAGING.    PT  DAUGHTER ZAIRA IS REQUESTING A CALL BACK [phone]

## 2024-11-07 NOTE — TELEPHONE ENCOUNTER
Left message for Thu that patient needs to see a hand surgeon. Dr. Lux does not do hand surgery or second opinion hands.